# Patient Record
Sex: FEMALE | Race: WHITE
[De-identification: names, ages, dates, MRNs, and addresses within clinical notes are randomized per-mention and may not be internally consistent; named-entity substitution may affect disease eponyms.]

---

## 2017-02-17 ENCOUNTER — HOSPITAL ENCOUNTER (OUTPATIENT)
Dept: HOSPITAL 58 - RAD | Age: 52
Discharge: HOME | End: 2017-02-17
Attending: NURSE PRACTITIONER

## 2017-02-17 VITALS — BODY MASS INDEX: 30.2 KG/M2

## 2017-02-17 DIAGNOSIS — Z12.31: Primary | ICD-10-CM

## 2017-02-20 NOTE — MAMMO
EXAM:  Digital screening mammogram 

  

HISTORY:  Screening 

  

COMPARISON:  06/19/2015 

  

FINDINGS:  Digital  MLO and CC views of the right and left breast were performed.  There are scatter
ed fibroglandular densities. Stable benign nodule left lower inner quadrant. There is no evidence fo
r new mass, asymmetry, distortion, or suspicious calcifications in either breast. 

  

IMPRESSION: 

1.  Benign stable mammogram 

2.  Annual screening mammogram is recommended in one year. 

  

BIRADS category 2, benign

## 2017-05-17 ENCOUNTER — HOSPITAL ENCOUNTER (OUTPATIENT)
Dept: HOSPITAL 58 - LAB | Age: 52
Discharge: HOME | End: 2017-05-17
Attending: NURSE PRACTITIONER

## 2017-05-17 VITALS — BODY MASS INDEX: 30.2 KG/M2

## 2017-05-17 DIAGNOSIS — E78.5: Primary | ICD-10-CM

## 2017-05-17 DIAGNOSIS — Z72.0: ICD-10-CM

## 2017-05-17 LAB
ALBUMIN SERPL-MCNC: 3.9 G/DL (ref 3.4–5)
ALBUMIN/GLOB SERPL: 1.26 {RATIO}
ALP SERPL-CCNC: 69 U/L (ref 42–98)
ALT SERPL-CCNC: 12 U/L (ref 12–78)
ANION GAP SERPL CALC-SCNC: 12.9 MMOL/L
AST SERPL-CCNC: 17 U/L (ref 15–37)
BASOPHILS # BLD AUTO: 0.1 K/UL (ref 0–0.2)
BASOPHILS NFR BLD AUTO: 1.1 % (ref 0–3)
BILIRUB SERPL-MCNC: 0.29 MG/DL (ref 0–1.2)
BUN SERPL-MCNC: 18 MG/DL (ref 7–18)
BUN/CREAT SERPL: 19.35
CALCIUM SERPL-MCNC: 9.5 MG/DL (ref 8.2–10.2)
CHLORIDE SERPL-SCNC: 104 MMOL/L (ref 98–107)
CHOLEST SERPL-MCNC: 265 MG/DL (ref 0–200)
CHOLEST/HDLC SERPL: 4.7 {RATIO} (ref 4.5–5.5)
CO2 BLD-SCNC: 27 MMOL/L (ref 21–32)
CREAT SERPL-MCNC: 0.93 MG/DL (ref 0.6–1.3)
EOSINOPHIL # BLD AUTO: 0.2 K/UL (ref 0–0.7)
EOSINOPHIL NFR BLD AUTO: 3 % (ref 0–7)
GFR SERPLBLD BASED ON 1.73 SQ M-ARVRAT: 64 ML/MIN
GLOBULIN SER CALC-MCNC: 3.1 G/L
GLUCOSE SERPL-MCNC: 72 MG/DL (ref 70–110)
HCT VFR BLD AUTO: 42.5 % (ref 37–47)
HDLC SERPL-MCNC: 56 MG/DL (ref 35–80)
HGB BLD-MCNC: 14.2 G/DL (ref 12–16)
IMM GRANULOCYTES NFR BLD AUTO: 0.3 % (ref 0–5)
LYMPHOCYTES # SPEC AUTO: 2.5 K/UL (ref 0.6–3.4)
LYMPHOCYTES NFR BLD AUTO: 39.4 % (ref 10–50)
MCH RBC QN: 31.5 PG (ref 27–31)
MCHC RBC AUTO-ENTMCNC: 33.4 G/DL (ref 31.8–35.4)
MCV RBC: 94.2 FL (ref 81–99)
MONOCYTES # BLD AUTO: 0.5 K/UL (ref 0.4–2)
MONOCYTES NFR BLD AUTO: 7.4 % (ref 0–10)
NEUTROPHILS # BLD AUTO: 3.1 K/UL (ref 2–6.9)
NEUTROPHILS NFR BLD AUTO: 48.8 %
PLATELET # BLD AUTO: 298 10^3/UL (ref 140–440)
POTASSIUM SERPL-SCNC: 3.9 MMOL/L (ref 3.5–5.1)
PROT SERPL-MCNC: 7 G/DL (ref 6.4–8.2)
RBC # BLD AUTO: 4.51 10^6/UL (ref 4.2–5.4)
SODIUM SERPL-SCNC: 140 MMOL/L (ref 136–145)
TRIGL SERPL-MCNC: 77 MG/DL (ref 30–150)
VLDLC SERPL CALC-MCNC: 15 MG/DL (ref 2–30)
WBC # BLD AUTO: 6.39 K/UL (ref 4.6–10.2)

## 2017-05-17 PROCEDURE — 36415 COLL VENOUS BLD VENIPUNCTURE: CPT

## 2017-05-17 PROCEDURE — 80053 COMPREHEN METABOLIC PANEL: CPT

## 2017-05-17 PROCEDURE — 85025 COMPLETE CBC W/AUTO DIFF WBC: CPT

## 2017-05-17 PROCEDURE — 80061 LIPID PANEL: CPT

## 2017-10-24 ENCOUNTER — HOSPITAL ENCOUNTER (OUTPATIENT)
Dept: HOSPITAL 58 - LAB | Age: 52
Discharge: HOME | End: 2017-10-24
Attending: NURSE PRACTITIONER

## 2017-10-24 VITALS — BODY MASS INDEX: 30.2 KG/M2

## 2017-10-24 DIAGNOSIS — M54.9: ICD-10-CM

## 2017-10-24 DIAGNOSIS — E78.5: Primary | ICD-10-CM

## 2017-10-24 DIAGNOSIS — G89.29: ICD-10-CM

## 2017-10-24 DIAGNOSIS — Z72.0: ICD-10-CM

## 2017-10-24 LAB
ALBUMIN SERPL-MCNC: 3.6 G/DL (ref 3.4–5)
ALBUMIN/GLOB SERPL: 1.16 {RATIO}
ALP SERPL-CCNC: 65 U/L (ref 42–98)
ALT SERPL-CCNC: 16 U/L (ref 12–78)
ANION GAP SERPL CALC-SCNC: 12.1 MMOL/L
AST SERPL-CCNC: 24 U/L (ref 15–37)
BILIRUB SERPL-MCNC: 0.35 MG/DL (ref 0–1.2)
BUN SERPL-MCNC: 13 MG/DL (ref 7–18)
BUN/CREAT SERPL: 15.47
CALCIUM SERPL-MCNC: 9.6 MG/DL (ref 8.2–10.2)
CHLORIDE SERPL-SCNC: 107 MMOL/L (ref 98–107)
CHOLEST SERPL-MCNC: 187 MG/DL (ref 0–200)
CHOLEST/HDLC SERPL: 4.1 {RATIO} (ref 4.5–5.5)
CO2 BLD-SCNC: 27 MMOL/L (ref 21–32)
CREAT SERPL-MCNC: 0.84 MG/DL (ref 0.6–1.3)
GFR SERPLBLD BASED ON 1.73 SQ M-ARVRAT: 72 ML/MIN
GLOBULIN SER CALC-MCNC: 3.1 G/L
GLUCOSE SERPL-MCNC: 95 MG/DL (ref 70–110)
HDLC SERPL-MCNC: 46 MG/DL (ref 35–80)
POTASSIUM SERPL-SCNC: 4.1 MMOL/L (ref 3.5–5.1)
PROT SERPL-MCNC: 6.7 G/DL (ref 6.4–8.2)
SODIUM SERPL-SCNC: 142 MMOL/L (ref 136–145)
TRIGL SERPL-MCNC: 125 MG/DL (ref 30–150)
VLDLC SERPL CALC-MCNC: 25 MG/DL (ref 2–30)

## 2017-10-24 PROCEDURE — 80061 LIPID PANEL: CPT

## 2017-10-24 PROCEDURE — 80053 COMPREHEN METABOLIC PANEL: CPT

## 2017-10-24 PROCEDURE — 36415 COLL VENOUS BLD VENIPUNCTURE: CPT

## 2017-10-24 PROCEDURE — 84443 ASSAY THYROID STIM HORMONE: CPT

## 2018-04-24 ENCOUNTER — HOSPITAL ENCOUNTER (OUTPATIENT)
Dept: HOSPITAL 58 - RHC-LAB | Age: 53
Discharge: HOME | End: 2018-04-24
Attending: NURSE PRACTITIONER

## 2018-04-24 VITALS — BODY MASS INDEX: 30.2 KG/M2

## 2018-04-24 DIAGNOSIS — E78.5: Primary | ICD-10-CM

## 2018-04-24 DIAGNOSIS — Z72.0: ICD-10-CM

## 2018-04-24 PROCEDURE — 85025 COMPLETE CBC W/AUTO DIFF WBC: CPT

## 2018-04-24 PROCEDURE — 80061 LIPID PANEL: CPT

## 2018-04-24 PROCEDURE — 36415 COLL VENOUS BLD VENIPUNCTURE: CPT

## 2018-04-24 PROCEDURE — 80053 COMPREHEN METABOLIC PANEL: CPT

## 2018-08-15 ENCOUNTER — HOSPITAL ENCOUNTER (OUTPATIENT)
Dept: HOSPITAL 58 - RAD | Age: 53
Discharge: HOME | End: 2018-08-15
Attending: NURSE PRACTITIONER

## 2018-08-15 VITALS — BODY MASS INDEX: 30.2 KG/M2

## 2018-08-15 DIAGNOSIS — Z12.31: Primary | ICD-10-CM

## 2018-08-15 DIAGNOSIS — R07.9: ICD-10-CM

## 2018-08-15 DIAGNOSIS — R01.2: ICD-10-CM

## 2018-08-15 PROCEDURE — 77067 SCR MAMMO BI INCL CAD: CPT

## 2018-08-15 PROCEDURE — 93005 ELECTROCARDIOGRAM TRACING: CPT

## 2018-08-15 PROCEDURE — 93010 ELECTROCARDIOGRAM REPORT: CPT

## 2019-04-08 ENCOUNTER — HOSPITAL ENCOUNTER (OUTPATIENT)
Dept: HOSPITAL 58 - LAB | Age: 54
Discharge: HOME | End: 2019-04-08
Attending: NURSE PRACTITIONER

## 2019-04-08 VITALS — BODY MASS INDEX: 30.2 KG/M2

## 2019-04-08 DIAGNOSIS — E78.5: Primary | ICD-10-CM

## 2019-04-08 DIAGNOSIS — Z72.0: ICD-10-CM

## 2019-04-08 DIAGNOSIS — G47.00: ICD-10-CM

## 2019-04-08 PROCEDURE — 36415 COLL VENOUS BLD VENIPUNCTURE: CPT

## 2019-04-08 PROCEDURE — 84443 ASSAY THYROID STIM HORMONE: CPT

## 2019-04-08 PROCEDURE — 80053 COMPREHEN METABOLIC PANEL: CPT

## 2019-04-08 PROCEDURE — 80061 LIPID PANEL: CPT

## 2019-04-08 PROCEDURE — 85025 COMPLETE CBC W/AUTO DIFF WBC: CPT

## 2019-08-27 ENCOUNTER — HOSPITAL ENCOUNTER (OUTPATIENT)
Dept: HOSPITAL 58 - RAD | Age: 54
Discharge: HOME | End: 2019-08-27
Attending: NURSE PRACTITIONER

## 2019-08-27 VITALS — BODY MASS INDEX: 30.2 KG/M2

## 2019-08-27 DIAGNOSIS — Z12.31: Primary | ICD-10-CM

## 2020-09-30 NOTE — MAMMO
EXAM:  Bilateral digital screening mammogram (2-D and 3-D) 

  

History:  Screening 

  

Comparison:  Bilateral mammogram 08/15/2018 

  

Findings:  MLO and CC views of bilateral breasts demonstrate scattered fibroglandular breast parenchy
ma.  CAD was reviewed by the radiologist.  Tomosynthesis was performed.  Stable benign lymph node wit
hin the left breast.  There are no suspicious masses and no suspicious microcalcifications. 

  

Impression:  Benign stable mammogram.  Recommend followup routine screening mammography in 1 year. 

  

BI-RADS 2, benign Pt's morning labs drawn and pt assisted to reposition, call bell within reach.